# Patient Record
Sex: FEMALE | Race: WHITE | HISPANIC OR LATINO | URBAN - METROPOLITAN AREA
[De-identification: names, ages, dates, MRNs, and addresses within clinical notes are randomized per-mention and may not be internally consistent; named-entity substitution may affect disease eponyms.]

---

## 2019-04-10 ENCOUNTER — IMPORTED ENCOUNTER (OUTPATIENT)
Dept: URBAN - METROPOLITAN AREA CLINIC 31 | Facility: CLINIC | Age: 64
End: 2019-04-10

## 2019-04-10 PROBLEM — H04.123: Noted: 2019-04-10

## 2019-04-10 PROBLEM — H17.823: Noted: 2019-04-10

## 2019-04-10 PROBLEM — H25.13: Noted: 2019-04-10

## 2019-04-10 PROCEDURE — 92310 CONTACT LENS FITTING OU: CPT

## 2019-04-10 PROCEDURE — 92004 COMPRE OPH EXAM NEW PT 1/>: CPT

## 2019-04-10 PROCEDURE — 92014 COMPRE OPH EXAM EST PT 1/>: CPT

## 2019-04-10 PROCEDURE — 92015 DETERMINE REFRACTIVE STATE: CPT

## 2019-04-10 NOTE — PATIENT DISCUSSION
Dry Eye OU:  Start Xiidra BID. Discussed that Elise Duarte helps to increase the production of the patient's own tears and therefore can take 3-4 months for an improvement in symptoms. Use AT frequent.

## 2019-04-10 NOTE — PATIENT DISCUSSION
1.  Refractive error Annual Good ocular health documented. Discussed options of glasses contacts or refractive surgery. Discussed importance of annual eye exams. 2.  Dry Eye OU:  Start Xiidra BID. Discussed that [place] helps to increase the production of the patient's own tears and therefore can take 3-4 months for an improvement in symptoms. Use AT frequent. 3. Nuclear Sclerotic Cataract OU: Explained how cataracts can effect vision. Recommend clinical observation. The patient was advised to contact us if any change or worsening of vision. 4. 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 Xiidra BID no steroid for now.

## 2020-01-06 NOTE — PROCEDURE NOTE: SURGICAL
"<span style=""font-weight: bold;"">MR #:&nbsp;</span>&nbsp;*<br /> <br /> <span style=""font-weight: bold;"">PREOPERATIVE DIAGNOSIS:</span>&nbsp;Dermatochalasis upper lids<br /> <br /> <span style=""font-weight: bold;"">POSTOPERATIVE DIAGNOSIS:</span>&nbsp;Same<br /> <br /> <span style=""font-weight: bold;"">OPERATIVE PROCEDURE:</span>&nbsp; Upper lid blepharoplasty both eyes<br /> <br /> <span style=""font-weight: bold;"">ANESTHESIA: &nbsp;</span> &nbsp;&nbsp; Local MAC<br /> <br /> <span style=""font-weight: bold;"">ESTIMATED BLOOD LOSS:</span> &nbsp;Minimal

## 2022-04-02 ASSESSMENT — VISUAL ACUITY
OS_CC: 20/100
OS_SC: 20/40
OS_PH: CC 20/25
OD_CC: 20/60-2
OD_SC: 20/40
OD_SC: J214''
OS_SC: J114''
OD_PH: CC 20/25

## 2022-04-02 ASSESSMENT — TONOMETRY
OD_IOP_MMHG: 12
OS_IOP_MMHG: 12

## 2022-07-09 ENCOUNTER — TELEPHONE ENCOUNTER (OUTPATIENT)
Dept: URBAN - METROPOLITAN AREA CLINIC 121 | Facility: CLINIC | Age: 67
End: 2022-07-09

## 2022-07-10 ENCOUNTER — TELEPHONE ENCOUNTER (OUTPATIENT)
Dept: URBAN - METROPOLITAN AREA CLINIC 121 | Facility: CLINIC | Age: 67
End: 2022-07-10

## 2022-07-10 RX ORDER — LEVOTHYROXINE SODIUM 25 UG/1
TABLET ORAL
Refills: 0 | Status: ACTIVE | COMMUNITY
Start: 2018-07-17

## 2022-07-10 RX ORDER — CHLORPHENIRAMINE/PHENYLPROPAN 8 MG-75 MG
CAPSULE, EXTENDED RELEASE ORAL
Refills: 0 | Status: ACTIVE | COMMUNITY
Start: 2018-08-07